# Patient Record
Sex: MALE | Race: BLACK OR AFRICAN AMERICAN | NOT HISPANIC OR LATINO | URBAN - METROPOLITAN AREA
[De-identification: names, ages, dates, MRNs, and addresses within clinical notes are randomized per-mention and may not be internally consistent; named-entity substitution may affect disease eponyms.]

---

## 2017-11-12 ENCOUNTER — EMERGENCY (EMERGENCY)
Facility: HOSPITAL | Age: 57
LOS: 1 days | Discharge: HOME | End: 2017-11-12
Attending: EMERGENCY MEDICINE | Admitting: EMERGENCY MEDICINE
Payer: COMMERCIAL

## 2017-11-12 VITALS
OXYGEN SATURATION: 98 % | RESPIRATION RATE: 16 BRPM | DIASTOLIC BLOOD PRESSURE: 74 MMHG | TEMPERATURE: 98 F | SYSTOLIC BLOOD PRESSURE: 180 MMHG | HEART RATE: 78 BPM

## 2017-11-12 LAB
APPEARANCE UR: CLEAR — SIGNIFICANT CHANGE UP
BACTERIA # UR AUTO: SIGNIFICANT CHANGE UP
BILIRUB UR-MCNC: NEGATIVE — SIGNIFICANT CHANGE UP
BLOOD UR QL VISUAL: HIGH
COLOR SPEC: SIGNIFICANT CHANGE UP
GLUCOSE UR-MCNC: NEGATIVE — SIGNIFICANT CHANGE UP
KETONES UR-MCNC: NEGATIVE — SIGNIFICANT CHANGE UP
LEUKOCYTE ESTERASE UR-ACNC: NEGATIVE — SIGNIFICANT CHANGE UP
MUCOUS THREADS # UR AUTO: SIGNIFICANT CHANGE UP
NITRITE UR-MCNC: NEGATIVE — SIGNIFICANT CHANGE UP
PH UR: 6.5 — SIGNIFICANT CHANGE UP (ref 4.6–8)
PROT UR-MCNC: 10 — SIGNIFICANT CHANGE UP
RBC CASTS # UR COMP ASSIST: SIGNIFICANT CHANGE UP (ref 0–?)
SP GR SPEC: 1.01 — SIGNIFICANT CHANGE UP (ref 1–1.03)
UROBILINOGEN FLD QL: NORMAL E.U. — SIGNIFICANT CHANGE UP (ref 0.1–0.2)
WBC UR QL: SIGNIFICANT CHANGE UP (ref 0–?)

## 2017-11-12 PROCEDURE — 73010 X-RAY EXAM OF SHOULDER BLADE: CPT | Mod: 26,LT

## 2017-11-12 PROCEDURE — 99285 EMERGENCY DEPT VISIT HI MDM: CPT

## 2017-11-12 RX ORDER — KETOROLAC TROMETHAMINE 30 MG/ML
60 SYRINGE (ML) INJECTION ONCE
Qty: 0 | Refills: 0 | Status: DISCONTINUED | OUTPATIENT
Start: 2017-11-12 | End: 2017-11-12

## 2017-11-12 RX ORDER — LIDOCAINE 4 G/100G
1 CREAM TOPICAL ONCE
Qty: 0 | Refills: 0 | Status: COMPLETED | OUTPATIENT
Start: 2017-11-12 | End: 2017-11-12

## 2017-11-12 RX ORDER — OXYCODONE HYDROCHLORIDE 5 MG/1
1 TABLET ORAL
Qty: 8 | Refills: 0 | OUTPATIENT
Start: 2017-11-12 | End: 2017-11-14

## 2017-11-12 RX ADMIN — Medication 60 MILLIGRAM(S): at 17:39

## 2017-11-12 RX ADMIN — LIDOCAINE 1 PATCH: 4 CREAM TOPICAL at 17:40

## 2017-11-12 NOTE — ED PROVIDER NOTE - MEDICAL DECISION MAKING DETAILS
Back spasm neg cp sob neuro complaints. PE remarkable for lt scapular spasm. PLAN toradol lido patch, if no imprvement valium

## 2017-11-12 NOTE — ED PROVIDER NOTE - PLAN OF CARE
Rest, drink plenty of fluids.  Advance activity as tolerated. Apply Arnica gel 4 times a day for muscular pain as needed.  Take Fill the prescription for percocet, 1 pill every 6 hours for severe pain.  No not mix this medication with Tylenol as this medication already contains Tylenol.  Make sure to stay hydrated when taking this medication.  Please also use the stool softener Colace (also called docusate sodium) 100mg 3 times a day to avoid constipation which is common when taking percocet.  This can be purchased over the counter.  Watch for drowsiness while taking this medication. No driving or operating heavy machinery.  Follow up with your primary care physician in 48-72 hours- bring copies of your results.  Return to the ER for worsening or persistent symptoms, and/or ANY NEW OR CONCERNING SYMPTOMS. If you have issues obtaining follow up, please call: [phone] to obtain a doctor or specialist who takes your insurance in your area. Seen in ED for musculoskeletal pain. Limit further injury, over exertion, and rest affected area. See your regular doctor within 72 hours. Take motrin 600mg every 6h  and Tylenol 650mg every 4 hours as needed for mild to moderate pain. Take Oxycodone one tab every 6 hours as needed for severe pain. NEVER DRIVE OR OPERATE MACHINERY ON OXYCODONE IT WILL CAUSE ACCIDENTS. Please continue all home medications as directed.  Return to ER for new or worsening symptoms.    Rest, drink plenty of fluids.  Advance activity as tolerated. Apply Arnica gel 4 times a day for muscular pain as needed.  Take Fill the prescription for percocet, 1 pill every 6 hours for severe pain.  No not mix this medication with Tylenol as this medication already contains Tylenol.  Make sure to stay hydrated when taking this medication.  Please also use the stool softener Colace (also called docusate sodium) 100mg 3 times a day to avoid constipation which is common when taking percocet.  This can be purchased over the counter.  Watch for drowsiness while taking this medication. No driving or operating heavy machinery.  Follow up with your primary care physician in 48-72 hours- bring copies of your results.  Return to the ER for worsening or persistent symptoms, and/or ANY NEW OR CONCERNING SYMPTOMS. If you have issues obtaining follow up, please call: [phone] to obtain a doctor or specialist who takes your insurance in your area.

## 2017-11-12 NOTE — ED PROVIDER NOTE - CARE PLAN
Principal Discharge DX:	Back spasm Principal Discharge DX:	Back spasm  Instructions for follow-up, activity and diet:	Rest, drink plenty of fluids.  Advance activity as tolerated. Apply Arnica gel 4 times a day for muscular pain as needed.  Take Fill the prescription for percocet, 1 pill every 6 hours for severe pain.  No not mix this medication with Tylenol as this medication already contains Tylenol.  Make sure to stay hydrated when taking this medication.  Please also use the stool softener Colace (also called docusate sodium) 100mg 3 times a day to avoid constipation which is common when taking percocet.  This can be purchased over the counter.  Watch for drowsiness while taking this medication. No driving or operating heavy machinery.  Follow up with your primary care physician in 48-72 hours- bring copies of your results.  Return to the ER for worsening or persistent symptoms, and/or ANY NEW OR CONCERNING SYMPTOMS. If you have issues obtaining follow up, please call: [phone] to obtain a doctor or specialist who takes your insurance in your area. Principal Discharge DX:	Back spasm  Instructions for follow-up, activity and diet:	Seen in ED for musculoskeletal pain. Limit further injury, over exertion, and rest affected area. See your regular doctor within 72 hours. Take motrin 600mg every 6h  and Tylenol 650mg every 4 hours as needed for mild to moderate pain. Take Oxycodone one tab every 6 hours as needed for severe pain. NEVER DRIVE OR OPERATE MACHINERY ON OXYCODONE IT WILL CAUSE ACCIDENTS. Please continue all home medications as directed.  Return to ER for new or worsening symptoms.    Rest, drink plenty of fluids.  Advance activity as tolerated. Apply Arnica gel 4 times a day for muscular pain as needed.  Take Fill the prescription for percocet, 1 pill every 6 hours for severe pain.  No not mix this medication with Tylenol as this medication already contains Tylenol.  Make sure to stay hydrated when taking this medication.  Please also use the stool softener Colace (also called docusate sodium) 100mg 3 times a day to avoid constipation which is common when taking percocet.  This can be purchased over the counter.  Watch for drowsiness while taking this medication. No driving or operating heavy machinery.  Follow up with your primary care physician in 48-72 hours- bring copies of your results.  Return to the ER for worsening or persistent symptoms, and/or ANY NEW OR CONCERNING SYMPTOMS. If you have issues obtaining follow up, please call: [phone] to obtain a doctor or specialist who takes your insurance in your area.

## 2017-11-12 NOTE — ED PROVIDER NOTE - ATTENDING CONTRIBUTION TO CARE
Dr. Francois: I performed the initial face to face bedside interview with this patient regarding history of present illness, review of symptoms and past medical, social and family history.  I completed an independent physical examination.  I was the initial provider who evaluated this patient.  The history, review of symptoms and examination was documented by the scribe in my presence and I attest to the accuracy of the documentation.  I have signed out the follow up of any pending tests (i.e. labs, radiological studies) to the PA.  I have discussed the patient’s plan of care and disposition with the PA.

## 2017-11-12 NOTE — ED PROVIDER NOTE - NS_ ATTENDINGSCRIBEDETAILS _ED_A_ED_FT
Dr. Francois:  I personally performed the services described in the documentation, reviewed the documentation recorded by the scribe in my presence and it accurately and completely records my words and action. The scribe's documentation has been prepared under my direction and personally reviewed by me in its entirety. I confirm that the note above accurately reflects all work, treatment, procedures, and medical decision making performed by me.

## 2017-11-12 NOTE — ED PROVIDER NOTE - OBJECTIVE STATEMENT
58 y/o M w/ PMHx of Prostate CA(last radiation was in 2014 and has f/u regularly) presents to ED c/o x5days of worsening diffuse upper back spasms mainly on the lt upper back. Reports pain radiates into the lt arm. Endorses abdominal pain worsened by coughing; cough noted as non-productive. Also c/o urinary frequency. Denies neck pain, h/o kidney issues, and other complaints. Has been taking Tylenol extra strength and ibuprofen to no relief; none taken in last x6hrs.

## 2017-11-14 LAB
BACTERIA UR CULT: SIGNIFICANT CHANGE UP
SPECIMEN SOURCE: SIGNIFICANT CHANGE UP

## 2018-06-22 ENCOUNTER — EMERGENCY (EMERGENCY)
Facility: HOSPITAL | Age: 58
LOS: 1 days | Discharge: HOME | End: 2018-06-22
Attending: EMERGENCY MEDICINE | Admitting: EMERGENCY MEDICINE
Payer: COMMERCIAL

## 2018-06-22 VITALS
HEART RATE: 87 BPM | DIASTOLIC BLOOD PRESSURE: 96 MMHG | RESPIRATION RATE: 18 BRPM | OXYGEN SATURATION: 100 % | SYSTOLIC BLOOD PRESSURE: 190 MMHG | TEMPERATURE: 98 F

## 2018-06-22 LAB
ALBUMIN SERPL ELPH-MCNC: 4.3 G/DL — SIGNIFICANT CHANGE UP (ref 3.3–5)
ALP SERPL-CCNC: 61 U/L — SIGNIFICANT CHANGE UP (ref 40–120)
ALT FLD-CCNC: 37 U/L — SIGNIFICANT CHANGE UP (ref 4–41)
AST SERPL-CCNC: 39 U/L — SIGNIFICANT CHANGE UP (ref 4–40)
BASOPHILS # BLD AUTO: 0.05 K/UL — SIGNIFICANT CHANGE UP (ref 0–0.2)
BASOPHILS NFR BLD AUTO: 0.6 % — SIGNIFICANT CHANGE UP (ref 0–2)
BILIRUB SERPL-MCNC: 0.3 MG/DL — SIGNIFICANT CHANGE UP (ref 0.2–1.2)
BUN SERPL-MCNC: 13 MG/DL — SIGNIFICANT CHANGE UP (ref 7–23)
BUN SERPL-MCNC: 13 MG/DL — SIGNIFICANT CHANGE UP (ref 7–23)
CALCIUM SERPL-MCNC: 9.1 MG/DL — SIGNIFICANT CHANGE UP (ref 8.4–10.5)
CALCIUM SERPL-MCNC: 9.4 MG/DL — SIGNIFICANT CHANGE UP (ref 8.4–10.5)
CHLORIDE SERPL-SCNC: 103 MMOL/L — SIGNIFICANT CHANGE UP (ref 98–107)
CHLORIDE SERPL-SCNC: 99 MMOL/L — SIGNIFICANT CHANGE UP (ref 98–107)
CO2 SERPL-SCNC: 26 MMOL/L — SIGNIFICANT CHANGE UP (ref 22–31)
CO2 SERPL-SCNC: 28 MMOL/L — SIGNIFICANT CHANGE UP (ref 22–31)
CREAT SERPL-MCNC: 1.2 MG/DL — SIGNIFICANT CHANGE UP (ref 0.5–1.3)
CREAT SERPL-MCNC: 1.27 MG/DL — SIGNIFICANT CHANGE UP (ref 0.5–1.3)
EOSINOPHIL # BLD AUTO: 0.12 K/UL — SIGNIFICANT CHANGE UP (ref 0–0.5)
EOSINOPHIL NFR BLD AUTO: 1.4 % — SIGNIFICANT CHANGE UP (ref 0–6)
GLUCOSE SERPL-MCNC: 118 MG/DL — HIGH (ref 70–99)
GLUCOSE SERPL-MCNC: 80 MG/DL — SIGNIFICANT CHANGE UP (ref 70–99)
HCT VFR BLD CALC: 48.1 % — SIGNIFICANT CHANGE UP (ref 39–50)
HGB BLD-MCNC: 15.6 G/DL — SIGNIFICANT CHANGE UP (ref 13–17)
IMM GRANULOCYTES # BLD AUTO: 0.02 # — SIGNIFICANT CHANGE UP
IMM GRANULOCYTES NFR BLD AUTO: 0.2 % — SIGNIFICANT CHANGE UP (ref 0–1.5)
INR BLD: 1.04 — SIGNIFICANT CHANGE UP (ref 0.88–1.17)
LYMPHOCYTES # BLD AUTO: 4.5 K/UL — HIGH (ref 1–3.3)
LYMPHOCYTES # BLD AUTO: 53.6 % — HIGH (ref 13–44)
MAGNESIUM SERPL-MCNC: 2.6 MG/DL — SIGNIFICANT CHANGE UP (ref 1.6–2.6)
MCHC RBC-ENTMCNC: 23.8 PG — LOW (ref 27–34)
MCHC RBC-ENTMCNC: 32.4 % — SIGNIFICANT CHANGE UP (ref 32–36)
MCV RBC AUTO: 73.3 FL — LOW (ref 80–100)
MONOCYTES # BLD AUTO: 0.59 K/UL — SIGNIFICANT CHANGE UP (ref 0–0.9)
MONOCYTES NFR BLD AUTO: 7 % — SIGNIFICANT CHANGE UP (ref 2–14)
NEUTROPHILS # BLD AUTO: 3.12 K/UL — SIGNIFICANT CHANGE UP (ref 1.8–7.4)
NEUTROPHILS NFR BLD AUTO: 37.2 % — LOW (ref 43–77)
NRBC # FLD: 0 — SIGNIFICANT CHANGE UP
PLATELET # BLD AUTO: 243 K/UL — SIGNIFICANT CHANGE UP (ref 150–400)
PMV BLD: 9.5 FL — SIGNIFICANT CHANGE UP (ref 7–13)
POTASSIUM SERPL-MCNC: 4 MMOL/L — SIGNIFICANT CHANGE UP (ref 3.5–5.3)
POTASSIUM SERPL-MCNC: SIGNIFICANT CHANGE UP MMOL/L (ref 3.5–5.3)
POTASSIUM SERPL-SCNC: 4 MMOL/L — SIGNIFICANT CHANGE UP (ref 3.5–5.3)
POTASSIUM SERPL-SCNC: SIGNIFICANT CHANGE UP MMOL/L (ref 3.5–5.3)
PROT SERPL-MCNC: SIGNIFICANT CHANGE UP G/DL (ref 6–8.3)
PROTHROM AB SERPL-ACNC: 12 SEC — SIGNIFICANT CHANGE UP (ref 9.8–13.1)
RBC # BLD: 6.56 M/UL — HIGH (ref 4.2–5.8)
RBC # FLD: 15.6 % — HIGH (ref 10.3–14.5)
SODIUM SERPL-SCNC: 134 MMOL/L — LOW (ref 135–145)
SODIUM SERPL-SCNC: 140 MMOL/L — SIGNIFICANT CHANGE UP (ref 135–145)
TROPONIN T, HIGH SENSITIVITY: 7 NG/L — SIGNIFICANT CHANGE UP (ref ?–14)
TROPONIN T, HIGH SENSITIVITY: 8 NG/L — SIGNIFICANT CHANGE UP (ref ?–14)
WBC # BLD: 8.4 K/UL — SIGNIFICANT CHANGE UP (ref 3.8–10.5)
WBC # FLD AUTO: 8.4 K/UL — SIGNIFICANT CHANGE UP (ref 3.8–10.5)

## 2018-06-22 PROCEDURE — 93010 ELECTROCARDIOGRAM REPORT: CPT

## 2018-06-22 PROCEDURE — 70450 CT HEAD/BRAIN W/O DYE: CPT | Mod: 26

## 2018-06-22 PROCEDURE — 71046 X-RAY EXAM CHEST 2 VIEWS: CPT | Mod: 26

## 2018-06-22 PROCEDURE — 99285 EMERGENCY DEPT VISIT HI MDM: CPT | Mod: 25

## 2018-06-22 RX ORDER — ACETAMINOPHEN 500 MG
650 TABLET ORAL ONCE
Qty: 0 | Refills: 0 | Status: COMPLETED | OUTPATIENT
Start: 2018-06-22 | End: 2018-06-22

## 2018-06-22 RX ORDER — SODIUM CHLORIDE 9 MG/ML
1000 INJECTION INTRAMUSCULAR; INTRAVENOUS; SUBCUTANEOUS ONCE
Qty: 0 | Refills: 0 | Status: COMPLETED | OUTPATIENT
Start: 2018-06-22 | End: 2018-06-22

## 2018-06-22 RX ADMIN — SODIUM CHLORIDE 1000 MILLILITER(S): 9 INJECTION INTRAMUSCULAR; INTRAVENOUS; SUBCUTANEOUS at 18:10

## 2018-06-22 RX ADMIN — Medication 650 MILLIGRAM(S): at 22:27

## 2018-06-22 NOTE — CONSULT NOTE ADULT - SUBJECTIVE AND OBJECTIVE BOX
HPI:  The patient is a 56 yo m with PMHx of prostate cancer, not on any treatment now and raynaud presented with left hand and toe numbness. He states he get this kind of numbness and tingling in the winter, which resolves, but this appears to be more prolonged. No other complains, no weakness, no pain, no decrease in coordination.    MEDICATIONS  (STANDING):    MEDICATIONS  (PRN):    PAST MEDICAL & SURGICAL HISTORY:  Benign Prostatic Hypertrophy  Hemorrhoids  PC (Prostate Cancer)  History of Hemorrhoidectomy: 1994  PC (Prostate Cancer) Biopsy: June 2011    FAMILY HISTORY:  No pertinent family history in first degree relatives    Allergies    No Known Allergies    Intolerances    SHx - No smoking, No ETOH, No drug abuse    Review of Systems:  CONSTITUTIONAL:  No weight loss, fever, chills, weakness or fatigue.  HEENT:  Eyes:  No visual loss, blurred vision, double vision or yellow sclerae. Ears, Nose, Throat:  No hearing loss, sneezing, congestion, runny nose or sore throat.  CARDIOVASCULAR:  No chest pain, chest pressure or chest discomfort. No palpitations or edema.  GASTROINTESTINAL:  No anorexia, nausea, vomiting or diarrhea. No abdominal pain or blood.  NEUROLOGICAL: See HPI  MUSCULOSKELETAL:  No muscle, back pain, joint pain or stiffness.  PSYCHIATRIC:  No history of depression or anxiety.      Vital Signs Last 24 Hrs  T(C): 36.8 (22 Jun 2018 16:01), Max: 36.8 (22 Jun 2018 16:01)  T(F): 98.2 (22 Jun 2018 16:01), Max: 98.2 (22 Jun 2018 16:01)  HR: 87 (22 Jun 2018 16:01) (87 - 87)  BP: 190/96 (22 Jun 2018 16:01) (190/96 - 190/96)  BP(mean): --  RR: 18 (22 Jun 2018 16:01) (18 - 18)  SpO2: 100% (22 Jun 2018 16:01) (100% - 100%)    General Exam:   General appearance: No acute distress                   Neurological Exam:  Mental Status: Orientated to self, date and place.    No dysarthria, aphasia or neglect.      Cranial Nerves: CN I - not tested.  PERRL, EOMI, VFF, no nystagmus or diplopia.  No APD.    Fundi not visualized bilaterally.    No facial asymmetry.  Hearing intact to finger rub bilaterally.     Motor:   Tone: normal.                  Strength:     [] Upper extremity                      Delt       Bicep    Tricep                                                  R         5/5        5/5        5/5       5/5                                               L          5/5        5/5        5/5       5/5  [] Lower extremity                       HF          KE          KF        DF         PF                                               R        5/5        5/5        5/5       5/5       5/5                                               L         5/5        5/5       5/5       5/5        5/5  Pronator drift: none                 Dysmetria: BL NL FTN  No truncal ataxia.    Tremor: No resting, postural or action tremor.  No myoclonus.    Sensation: intact to light touch, pinprick, vibration and proprioception    Deep Tendon Reflexes:   Right 2+ : BC, TC, BRD   Left 2+ : BC, TC, BRD  Right 2+ Knee, 1+ ankle  Left 2+ Knee, 1+ ankle    Toes flexor bilaterally  Gait: normal and stable.      Other:    06-22    134<L>  |  99  |  13  ----------------------------<  80  Test not performed SPECIMEN GROSSLY HEMOLYZED   |  28  |  1.27    Ca    9.4      22 Jun 2018 18:09  Mg     2.6     06-22    TPro  Test not performed SPECIMEN GROSSLY HEMOLYZED  /  Alb  4.3  /  TBili  0.3  /  DBili  x   /  AST  39  /  ALT  37  /  AlkPhos  61  06-22                          15.6   8.40  )-----------( 243      ( 22 Jun 2018 18:09 )             48.1       Radiology    CT  MRI  EKG:  tele:  TTE:  EEG:

## 2018-06-22 NOTE — ED ADULT TRIAGE NOTE - CHIEF COMPLAINT QUOTE
C/o pins and needle feeling to right side of body for years, mostly in finger tips and toes. Symptoms noticed most in the cold. Pt ambulates. NO slurred speech, facial droop or arm drift noted. C/o pins and needle feeling to right side of body for years, mostly in finger tips and toes. Symptoms noticed most in the cold. Symptoms are worse today. Pt ambulates. NO slurred speech, facial droop or arm drift noted.

## 2018-06-22 NOTE — ED PROVIDER NOTE - MEDICAL DECISION MAKING DETAILS
56yo M with untreated HTN, pt had 2 sets of cardiac enzymes, 6 and 8 respectively, no chest pain at any point during ED eval, pt eval'd by Neurology, pt stable to be discharged home and follow up with PMD for HTN mgmt and Neuro.

## 2018-06-22 NOTE — ED ADULT NURSE NOTE - CHIEF COMPLAINT QUOTE
C/o pins and needle feeling to right side of body for years, mostly in finger tips and toes. Symptoms noticed most in the cold. Symptoms are worse today. Pt ambulates. NO slurred speech, facial droop or arm drift noted.

## 2018-06-22 NOTE — ED PROVIDER NOTE - OBJECTIVE STATEMENT
56yo M with untreated HTN, hx of Prostate CA s/p radiation 2014', presents to the ED c/o sudden onset HA, dizziness and R upper and lower extremity paresthesias which began around 11:00am today. Pt reports "pins and needles" sensation of the tips of his fingers of R hand and toes of R foot. Pt states he experienced mild retro-sternal chest pain this morning, states it was non-exertional, resolved spontaneously after a few minutes, pt states he does not currently have chest pain or sob. Pt states he is not under the care of PMD, states since his PMD passed away 3 years ago has not had primary care visit. Pt does follow up with his Urologist every 6 months, had follow up visit on 7/6/18. Pt states he has had this sensation in his fingers and toes in the past but today he was concerned and came to the ED for eval. Pt denies palpitations, leg swelling, hemoptysis, hx of migraines, abdominal pain, N/V, blurred vision. Pt is a .5ppd smoker for >20 years, pt states he smoked 1.5ppd daily for >20 years but decreased to .5ppd about 1 year ago.

## 2018-06-22 NOTE — CONSULT NOTE ADULT - ASSESSMENT
58 yo m with PMHx of prostate cancer, not on any treatment now and raynaud presented with left hand and toe numbness. He states he get this kind of numbness and tingling in the winter, which resolves, but this appears to be more prolonged. No other complains, no weakness, no pain, no decrease in coordination. Exam WNL. CTH no acute findings.  Impression: Likely to be neuropathy with unclear origin at this point  Plan:  [] Outpatient neurology follow up with Dr. Nissenbaum

## 2018-06-22 NOTE — ED PROVIDER NOTE - PROGRESS NOTE DETAILS
Pt sitting in bed comfortably in NAD, pt stable to be discharged home and follow up with new PMD in 1-2 days for HTN mgmt, f/u with Neuro for hand numbness.

## 2018-06-22 NOTE — ED ADULT NURSE NOTE - OBJECTIVE STATEMENT
Pt presents to intake room 9, A&Ox3, ambulatory at baseline without assistance, coming in for evaluation of right sided numbness and tingling since 11am. pt also c/o elevated blood pressure and dizziness. Denies any chest pain, nausea, vomiting, shortness of breath, palpitations, diarrhea, fever, constipation, or chills. Pt presents to intake room 9, A&Ox3, ambulatory at baseline without assistance, coming in for evaluation of right sided numbness and tingling since 11am. pt also c/o elevated blood pressure and dizziness. Denies any chest pain, nausea, vomiting, shortness of breath, palpitations, diarrhea, fever, constipation, or chills.   IV established in left ac with a 22g, labs drawn and sent, call bell in reach, side rails up, bed in locked position, md evaluation in progress, will continue to monitor.

## 2019-02-08 ENCOUNTER — EMERGENCY (EMERGENCY)
Facility: HOSPITAL | Age: 59
LOS: 1 days | Discharge: HOME | End: 2019-02-08
Attending: EMERGENCY MEDICINE | Admitting: EMERGENCY MEDICINE
Payer: COMMERCIAL

## 2019-02-08 VITALS
SYSTOLIC BLOOD PRESSURE: 153 MMHG | DIASTOLIC BLOOD PRESSURE: 76 MMHG | OXYGEN SATURATION: 100 % | RESPIRATION RATE: 16 BRPM | TEMPERATURE: 99 F | HEART RATE: 81 BPM

## 2019-02-08 VITALS
SYSTOLIC BLOOD PRESSURE: 190 MMHG | TEMPERATURE: 98 F | DIASTOLIC BLOOD PRESSURE: 90 MMHG | RESPIRATION RATE: 16 BRPM | OXYGEN SATURATION: 100 % | HEART RATE: 87 BPM

## 2019-02-08 LAB
ALBUMIN SERPL ELPH-MCNC: 4.2 G/DL — SIGNIFICANT CHANGE UP (ref 3.3–5)
ALP SERPL-CCNC: 64 U/L — SIGNIFICANT CHANGE UP (ref 40–120)
ALT FLD-CCNC: 12 U/L — SIGNIFICANT CHANGE UP (ref 4–41)
ANION GAP SERPL CALC-SCNC: 13 MMO/L — SIGNIFICANT CHANGE UP (ref 7–14)
APTT BLD: 42.6 SEC — HIGH (ref 27.5–36.3)
AST SERPL-CCNC: 27 U/L — SIGNIFICANT CHANGE UP (ref 4–40)
BASOPHILS # BLD AUTO: 0.04 K/UL — SIGNIFICANT CHANGE UP (ref 0–0.2)
BASOPHILS NFR BLD AUTO: 0.6 % — SIGNIFICANT CHANGE UP (ref 0–2)
BILIRUB SERPL-MCNC: 0.2 MG/DL — SIGNIFICANT CHANGE UP (ref 0.2–1.2)
BUN SERPL-MCNC: 7 MG/DL — SIGNIFICANT CHANGE UP (ref 7–23)
CALCIUM SERPL-MCNC: 9.5 MG/DL — SIGNIFICANT CHANGE UP (ref 8.4–10.5)
CHLORIDE SERPL-SCNC: 101 MMOL/L — SIGNIFICANT CHANGE UP (ref 98–107)
CO2 SERPL-SCNC: 26 MMOL/L — SIGNIFICANT CHANGE UP (ref 22–31)
CREAT SERPL-MCNC: 1.05 MG/DL — SIGNIFICANT CHANGE UP (ref 0.5–1.3)
EOSINOPHIL # BLD AUTO: 0.06 K/UL — SIGNIFICANT CHANGE UP (ref 0–0.5)
EOSINOPHIL NFR BLD AUTO: 0.9 % — SIGNIFICANT CHANGE UP (ref 0–6)
GLUCOSE SERPL-MCNC: 85 MG/DL — SIGNIFICANT CHANGE UP (ref 70–99)
HCT VFR BLD CALC: 51.2 % — HIGH (ref 39–50)
HGB BLD-MCNC: 15.9 G/DL — SIGNIFICANT CHANGE UP (ref 13–17)
IMM GRANULOCYTES NFR BLD AUTO: 0.5 % — SIGNIFICANT CHANGE UP (ref 0–1.5)
INR BLD: 1.04 — SIGNIFICANT CHANGE UP (ref 0.88–1.17)
LYMPHOCYTES # BLD AUTO: 2.92 K/UL — SIGNIFICANT CHANGE UP (ref 1–3.3)
LYMPHOCYTES # BLD AUTO: 45.6 % — HIGH (ref 13–44)
MCHC RBC-ENTMCNC: 24.2 PG — LOW (ref 27–34)
MCHC RBC-ENTMCNC: 31.1 % — LOW (ref 32–36)
MCV RBC AUTO: 77.8 FL — LOW (ref 80–100)
MONOCYTES # BLD AUTO: 0.54 K/UL — SIGNIFICANT CHANGE UP (ref 0–0.9)
MONOCYTES NFR BLD AUTO: 8.4 % — SIGNIFICANT CHANGE UP (ref 2–14)
NEUTROPHILS # BLD AUTO: 2.82 K/UL — SIGNIFICANT CHANGE UP (ref 1.8–7.4)
NEUTROPHILS NFR BLD AUTO: 44 % — SIGNIFICANT CHANGE UP (ref 43–77)
NRBC # FLD: 0 K/UL — LOW (ref 25–125)
PLATELET # BLD AUTO: 236 K/UL — SIGNIFICANT CHANGE UP (ref 150–400)
PMV BLD: 9 FL — SIGNIFICANT CHANGE UP (ref 7–13)
POTASSIUM SERPL-MCNC: 5.2 MMOL/L — SIGNIFICANT CHANGE UP (ref 3.5–5.3)
POTASSIUM SERPL-SCNC: 5.2 MMOL/L — SIGNIFICANT CHANGE UP (ref 3.5–5.3)
PROT SERPL-MCNC: 7.7 G/DL — SIGNIFICANT CHANGE UP (ref 6–8.3)
PROTHROM AB SERPL-ACNC: 11.9 SEC — SIGNIFICANT CHANGE UP (ref 9.8–13.1)
RBC # BLD: 6.58 M/UL — HIGH (ref 4.2–5.8)
RBC # FLD: 17 % — HIGH (ref 10.3–14.5)
SODIUM SERPL-SCNC: 140 MMOL/L — SIGNIFICANT CHANGE UP (ref 135–145)
TROPONIN T, HIGH SENSITIVITY: 6 NG/L — SIGNIFICANT CHANGE UP (ref ?–14)
WBC # BLD: 6.41 K/UL — SIGNIFICANT CHANGE UP (ref 3.8–10.5)
WBC # FLD AUTO: 6.41 K/UL — SIGNIFICANT CHANGE UP (ref 3.8–10.5)

## 2019-02-08 PROCEDURE — 71046 X-RAY EXAM CHEST 2 VIEWS: CPT | Mod: 26

## 2019-02-08 PROCEDURE — 70450 CT HEAD/BRAIN W/O DYE: CPT | Mod: 26

## 2019-02-08 PROCEDURE — 99285 EMERGENCY DEPT VISIT HI MDM: CPT

## 2019-02-08 RX ORDER — AMLODIPINE BESYLATE 2.5 MG/1
1 TABLET ORAL
Qty: 14 | Refills: 0 | OUTPATIENT
Start: 2019-02-08 | End: 2019-02-21

## 2019-02-08 NOTE — ED PROVIDER NOTE - ATTENDING CONTRIBUTION TO CARE
Alvarado: 57 yom with HTN on meds but ran out over 3 weeks ago. C/o few hours ago pt was walking in the hallway and had episode of "head spinning" lower to floor and no syncope. Pt has had similar episodes to this in the past and was once seen in the ED for same, negative CT and told of possible pinched nerve but pt states it may have been a TIA. He noted right arm finger tip tingling and neck pain that has been chronic. No other symptoms.  On exam, pt is well appearing, hypertensive 201/89, otherwise normal vitals, clear lungs, normal cardiac, no nystagmus, no spinal tn, no paraspinal tn, from of neck with no meningismus, normal strength, motor exam, no drift, no dysmetria. Labs, CT, BP control. reassess.

## 2019-02-08 NOTE — ED ADULT NURSE NOTE - NSIMPLEMENTINTERV_GEN_ALL_ED
Implemented All Fall Risk Interventions:  [place] to call system. Call bell, personal items and telephone within reach. Instruct patient to call for assistance. Room bathroom lighting operational. Non-slip footwear when patient is off stretcher. Physically safe environment: no spills, clutter or unnecessary equipment. Stretcher in lowest position, wheels locked, appropriate side rails in place. Provide visual cue, wrist band, yellow gown, etc. Monitor gait and stability. Monitor for mental status changes and reorient to person, place, and time. Review medications for side effects contributing to fall risk. Reinforce activity limits and safety measures with patient and family.

## 2019-02-08 NOTE — ED PROVIDER NOTE - NSFOLLOWUPINSTRUCTIONS_ED_ALL_ED_FT
- A prescription has been sent to your pharmacy for your Blood Pressure medicine - please take as directed   - Please follow up with your Primary doctor in 1-2 days.  Your results from your ER visit have been provided - please share with your doctor.  - Please read the attached information sheets  - Return to the ER with any new or worsening symptoms.

## 2019-02-08 NOTE — ED ADULT TRIAGE NOTE - CHIEF COMPLAINT QUOTE
brought in by EMS from work s/p near syncopal episode. Pt was walking in [place] after coming out of bathroom and felt very dizzy.  Lowered self to floor against wall. FS on arrival as per EMS 60, BP on EMS arrival 200s/100s. FS now 94. Hx HTN, TIA. Denies dizziness now but has some blurred vision. FIT MD came to al brought in by EMS from work s/p near syncopal episode about 0930. Pt was walking in [place] after coming out of bathroom and felt very dizzy.  Lowered self to floor against wall. FS on arrival as per EMS 60, BP on EMS arrival 200s/100s. FS now 94. Hx HTN, TIA. Denies dizziness now but has some blurred vision. FIT MD came to [facility]

## 2019-02-08 NOTE — ED ADULT NURSE NOTE - CAS DISCH TRANSFER METHOD
Pt slept more than 5 hours on most rounds. No observed or reported s/s of distress. Will continue to monitor.   Private car

## 2019-02-08 NOTE — ED ADULT NURSE REASSESSMENT NOTE - NS ED NURSE REASSESS COMMENT FT1
Pt dc by MD. IV DC as per hospital protocol.   Pt expressed understanding of DC instructions.  Ambulated to exit with wife

## 2019-02-08 NOTE — ED PROVIDER NOTE - PHYSICAL EXAMINATION
***GEN - well appearing; NAD   ***HEAD - NC/AT  ***EYES/NOSE - PEERL, EOMI, mucous membranes moist, no discharge, no nystagmus  ***THROAT: Oral cavity and pharynx normal. No inflammation, swelling, exudate, or lesions.    ***NECK: supple, non-tender no lymphadenopathy  ***PULMONARY - CTA b/l, symmetric breath sounds.   ***CARDIAC- s1s2, RRR, no murmur  ***ABDOMEN - +BS, ND, NT, soft, no guarding, no rebound, no organomegaly  ***BACK - no CVA tenderness, Normal  spine, no spinal TTP  ***EXTREMITIES - symmetric pulses, 2+ dp, capillary refill < 2 seconds, no clubbing, no cyanosis, no edema   ***SKIN - warm, dry, intact, no rash or bruising   ***NEUROLOGIC - a&o x3, CN 3-12 intact, sensation nl, motor 5/5 RUE/LUE/RLE/LLE, no pronator drift, no disdiadokinesia,

## 2019-02-08 NOTE — ED ADULT NURSE NOTE - OBJECTIVE STATEMENT
pt received to room #2 with c/o near syncopal episode while at work.  pt states he has HTN but ran out of his medication and never rescheduled his appointment, states he takes his HTN medication (amlodipine and hydrochlorothiazide) when he feels "bad". states after walking out of the bathroom he began to become dizzy and lowered himself to the ground, episode lasted approx 1 min. states this has happened before and has a referral to see a cardiologist, but has not followed up. chronic numbness to right finger 2/2 prior TIA. has not had neurology follow up. denies cp, sob, n/v/d at present and time of incident. on HM, NSR. IV placed, labs drawn and sent, pt seen by MD. wife at bedside, will cont to monitor.

## 2019-02-08 NOTE — ED PROVIDER NOTE - OBJECTIVE STATEMENT
56yo M with h/o untreated Hypertension (rx'd amlodipine and hctz), Prostate CA s/p radiation 2014, presents to the ED with episode of dizziness since resolved. Was walking down hallway, felt room spinning, lowered self to floor and symptoms resolved in unknown amount of time. Able to ambulate on own asymptomatically afterward.  Happened before in similar way.  States he has chronic R finger tingling from cervical radiculopathy unchanged.  Denies antecedent chest pain, shortness of breath, or other symptoms.  Denies focal weakness or loss of sensation.  Denies n/v, palpitations, fever, cough, URI symptoms, dysuria, diarrhea, recent travel, calf pain.  No hearing loss, tinnitus, ringing, fullness or ear pain.

## 2019-05-30 ENCOUNTER — EMERGENCY (EMERGENCY)
Facility: HOSPITAL | Age: 59
LOS: 0 days | Discharge: HOME | End: 2019-05-31
Attending: EMERGENCY MEDICINE
Payer: COMMERCIAL

## 2019-05-30 VITALS
RESPIRATION RATE: 17 BRPM | DIASTOLIC BLOOD PRESSURE: 72 MMHG | SYSTOLIC BLOOD PRESSURE: 161 MMHG | OXYGEN SATURATION: 100 % | HEART RATE: 95 BPM | TEMPERATURE: 98 F

## 2019-05-30 VITALS
WEIGHT: 162.04 LBS | SYSTOLIC BLOOD PRESSURE: 150 MMHG | HEIGHT: 65 IN | HEART RATE: 94 BPM | RESPIRATION RATE: 18 BRPM | DIASTOLIC BLOOD PRESSURE: 70 MMHG | TEMPERATURE: 98 F | OXYGEN SATURATION: 100 %

## 2019-05-30 DIAGNOSIS — Z85.46 PERSONAL HISTORY OF MALIGNANT NEOPLASM OF PROSTATE: ICD-10-CM

## 2019-05-30 DIAGNOSIS — M54.2 CERVICALGIA: ICD-10-CM

## 2019-05-30 DIAGNOSIS — N40.0 BENIGN PROSTATIC HYPERPLASIA WITHOUT LOWER URINARY TRACT SYMPTOMS: ICD-10-CM

## 2019-05-30 DIAGNOSIS — M25.512 PAIN IN LEFT SHOULDER: ICD-10-CM

## 2019-05-30 DIAGNOSIS — M54.5 LOW BACK PAIN: ICD-10-CM

## 2019-05-30 DIAGNOSIS — S39.012A STRAIN OF MUSCLE, FASCIA AND TENDON OF LOWER BACK, INITIAL ENCOUNTER: ICD-10-CM

## 2019-05-30 DIAGNOSIS — I10 ESSENTIAL (PRIMARY) HYPERTENSION: ICD-10-CM

## 2019-05-30 PROCEDURE — 99284 EMERGENCY DEPT VISIT MOD MDM: CPT

## 2019-05-31 PROCEDURE — 72131 CT LUMBAR SPINE W/O DYE: CPT | Mod: 26

## 2019-05-31 PROCEDURE — 72125 CT NECK SPINE W/O DYE: CPT | Mod: 26

## 2019-05-31 PROCEDURE — 73030 X-RAY EXAM OF SHOULDER: CPT | Mod: 26,LT

## 2019-05-31 RX ORDER — IBUPROFEN 200 MG
1 TABLET ORAL
Qty: 20 | Refills: 0
Start: 2019-05-31 | End: 2019-06-04

## 2019-05-31 RX ORDER — METHOCARBAMOL 500 MG/1
1 TABLET, FILM COATED ORAL
Qty: 15 | Refills: 0
Start: 2019-05-31 | End: 2019-06-04

## 2019-05-31 RX ORDER — METHOCARBAMOL 500 MG/1
750 TABLET, FILM COATED ORAL ONCE
Refills: 0 | Status: COMPLETED | OUTPATIENT
Start: 2019-05-31 | End: 2019-05-31

## 2019-05-31 RX ORDER — IBUPROFEN 200 MG
800 TABLET ORAL ONCE
Refills: 0 | Status: COMPLETED | OUTPATIENT
Start: 2019-05-31 | End: 2019-05-31

## 2019-05-31 RX ADMIN — METHOCARBAMOL 750 MILLIGRAM(S): 500 TABLET, FILM COATED ORAL at 01:25

## 2019-05-31 RX ADMIN — Medication 800 MILLIGRAM(S): at 01:23
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
## 2019-05-31 NOTE — ED PROVIDER NOTE - PHYSICAL EXAMINATION
Gen: Alert, mild distress, well appearing  Head: NC, AT, PERRL, EOMI, normal lids/conjunctiva  ENT: normal hearing, patent oropharynx without erythema/exudate, uvula midline  Neck: +supple, +midline tendernessJVD, +Trachea midline  Pulm: Bilateral BS, normal resp effort, no wheeze/stridor/retractions  CV: RRR, no M/R/G, +dist pulses  Abd: soft, NT/ND, +BS, no palpable masses  Mskel: no edema/erythema/cyanosis, left shoulder pain on movement, full ROM, +midline lumbar back tenderness  Skin: no rash, warm/dry  Neuro: AAOx3, no apparent sensory/motor deficits, coordination intact
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
## 2019-12-03 NOTE — ED PROVIDER NOTE - CONDITION AT DISCHARGE:
Discharge instructions discussed with patient and patients family. Both verbalize understanding and have no questions at this time.      Satisfactory
